# Patient Record
Sex: FEMALE
[De-identification: names, ages, dates, MRNs, and addresses within clinical notes are randomized per-mention and may not be internally consistent; named-entity substitution may affect disease eponyms.]

---

## 2024-09-27 PROBLEM — Z00.00 ENCOUNTER FOR PREVENTIVE HEALTH EXAMINATION: Status: ACTIVE | Noted: 2024-09-27

## 2024-09-30 ENCOUNTER — APPOINTMENT (OUTPATIENT)
Dept: ORTHOPEDIC SURGERY | Facility: CLINIC | Age: 69
End: 2024-09-30
Payer: MEDICARE

## 2024-09-30 VITALS — BODY MASS INDEX: 30.78 KG/M2 | WEIGHT: 163 LBS | HEIGHT: 61 IN

## 2024-09-30 DIAGNOSIS — M17.0 BILATERAL PRIMARY OSTEOARTHRITIS OF KNEE: ICD-10-CM

## 2024-09-30 PROCEDURE — 99203 OFFICE O/P NEW LOW 30 MIN: CPT

## 2024-09-30 PROCEDURE — 73562 X-RAY EXAM OF KNEE 3: CPT | Mod: RT

## 2024-09-30 RX ORDER — HYALURONATE SODIUM, STABILIZED 88 MG/4 ML
88 SYRINGE (ML) INTRAARTICULAR
Qty: 2 | Refills: 0 | Status: ACTIVE | OUTPATIENT
Start: 2024-09-30

## 2024-09-30 RX ORDER — CELECOXIB 200 MG/1
200 CAPSULE ORAL
Qty: 30 | Refills: 0 | Status: ACTIVE | COMMUNITY
Start: 2024-09-30 | End: 1900-01-01

## 2024-09-30 NOTE — PHYSICAL EXAM
[de-identified] : Right knee has some mild medial joint line tenderness soft tissue swelling and warmth is noted but no effusion range of motion 0 to 120 degrees knee stable to stress varus valgus is with full extension and 90 degrees flexion.  Similar findings on the left knee today mild medial joint line tenderness soft tissue welling and warmth is noted but no effusion.  Range of motion 0 to 125 degrees the knee is stable to AP stress varus valgus was both full extension and 9 degrees flexion.  Both knees and lower extremity neuro vas intact distally.  Quad tone is good extensor mechanism intact. [de-identified] : Knee radiographs were ordered today AP standing individual and sunrise views were obtained showing moderate diminishment of medial joint space on standing AP projection of both knees.This is similar to the radiographic report of radiographs she had performed on 926.

## 2024-09-30 NOTE — HISTORY OF PRESENT ILLNESS
[de-identified] : First-time visit for this 68-year-old female she is here with complaint of bilateral knee pain.  Patient states he has noticed increasing discomfort going up and down stairs and getting out of a seated position.  Pain is ongoing for a few years she recalls no specific accident incident or initiating traumatic event.  Patient is here for first-time evaluation.

## 2024-09-30 NOTE — REASON FOR VISIT
[Initial Visit] : an initial visit for [Knee Pain] : knee pain [FreeTextEntry2] : BILATERAL KNEE PAIN. RIGHT KNEE IS WORST. THIS STARTED ON 9/26/24. SHE IS HAVING SWELLING STATES THERE IS FLUID IN THE KNEE. SHE ALSO SAW ANOTHER DOCTOR AND HAD XRAYS DONE. WE HAVE THE REPORT BUT NO IMAGES.

## 2024-09-30 NOTE — DISCUSSION/SUMMARY
[de-identified] : Patient I were able to discussed at length the underlying etiology of her bilateral knee pain.  Patient was able to appreciate by direct inspection of the images of her radiographs that she has fairly vast medial part osteoarthritis of both knees.  We talked about her options.  At this point patient defers on cortisone injection we will order single-dose HA injections for the patient notify the when they are available for review's.  Patient is also told to liberally ice the knees when symptomatic and also take over-the-counter Tylenol for pain relief.  This consultation lasted 30 minutes.

## 2024-10-02 ENCOUNTER — APPOINTMENT (OUTPATIENT)
Dept: ORTHOPEDIC SURGERY | Age: 69
End: 2024-10-02

## 2024-10-21 ENCOUNTER — APPOINTMENT (OUTPATIENT)
Dept: ORTHOPEDIC SURGERY | Facility: CLINIC | Age: 69
End: 2024-10-21
Payer: MEDICARE

## 2024-10-21 DIAGNOSIS — M17.0 BILATERAL PRIMARY OSTEOARTHRITIS OF KNEE: ICD-10-CM

## 2024-10-21 PROCEDURE — 99214 OFFICE O/P EST MOD 30 MIN: CPT
